# Patient Record
Sex: FEMALE | Race: WHITE | URBAN - METROPOLITAN AREA
[De-identification: names, ages, dates, MRNs, and addresses within clinical notes are randomized per-mention and may not be internally consistent; named-entity substitution may affect disease eponyms.]

---

## 2023-02-28 ENCOUNTER — OFFICE VISIT (OUTPATIENT)
Dept: URBAN - METROPOLITAN AREA CLINIC 10 | Facility: CLINIC | Age: 58
End: 2023-02-28
Payer: COMMERCIAL

## 2023-02-28 DIAGNOSIS — H44.523: Primary | ICD-10-CM

## 2023-02-28 PROCEDURE — 92004 COMPRE OPH EXAM NEW PT 1/>: CPT | Performed by: OPTOMETRIST

## 2023-02-28 ASSESSMENT — INTRAOCULAR PRESSURE: OD: 12

## 2023-02-28 ASSESSMENT — VISUAL ACUITY
OS: LP
OD: LP

## 2023-02-28 NOTE — IMPRESSION/PLAN
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 Plan: Phthisis bulbi OU. No sx indicated. Pt reports many surgeries OU. Recommend artificial tears or lubricating rogers for comfort. Comfort measures. RTC 1 year CEE.